# Patient Record
Sex: MALE | URBAN - METROPOLITAN AREA
[De-identification: names, ages, dates, MRNs, and addresses within clinical notes are randomized per-mention and may not be internally consistent; named-entity substitution may affect disease eponyms.]

---

## 2022-11-04 ENCOUNTER — OFFICE VISIT (OUTPATIENT)
Dept: URBAN - METROPOLITAN AREA CLINIC 71 | Facility: CLINIC | Age: 52
End: 2022-11-04
Payer: COMMERCIAL

## 2022-11-04 DIAGNOSIS — H52.13 MYOPIA, BILATERAL: ICD-10-CM

## 2022-11-04 DIAGNOSIS — H43.813 BILATERAL VITREOUS DETACHMENT OF EYES: Primary | ICD-10-CM

## 2022-11-04 DIAGNOSIS — H52.4 PRESBYOPIA: ICD-10-CM

## 2022-11-04 PROCEDURE — 92004 COMPRE OPH EXAM NEW PT 1/>: CPT | Performed by: PHYSICIAN ASSISTANT

## 2022-11-04 ASSESSMENT — INTRAOCULAR PRESSURE
OD: 13
OS: 13

## 2022-11-04 ASSESSMENT — VISUAL ACUITY
OD: 20/20
OS: 20/20

## 2022-11-04 ASSESSMENT — KERATOMETRY
OD: 43.13
OS: 42.75

## 2022-11-04 NOTE — IMPRESSION/PLAN
Impression: Bilateral vitreous detachment of eyes: H43.813. Plan: Posterior vitreous detachment present. There is no evidence of retinal pathology. All signs and risks of retinal detachment and tears were discussed in detail. Patient instructed to call the office immediately if any symptoms noted. Recommend the patient return to office for follow up. Answered all questions and addressed all concerns. Pt is  -- discussed use of art tears and washing out eyes for chemicals and foreign bodies. Pt voiced understanding. Answered all questions.

## 2022-11-18 ENCOUNTER — REFRACTIVE (OUTPATIENT)
Dept: URBAN - METROPOLITAN AREA CLINIC 64 | Facility: CLINIC | Age: 52
End: 2022-11-18

## 2022-11-18 DIAGNOSIS — H52.13 MYOPIA, BILATERAL: Primary | ICD-10-CM

## 2022-11-18 ASSESSMENT — KERATOMETRY
OS: 42.51
OD: 42.72

## 2025-02-24 ENCOUNTER — OFFICE VISIT (OUTPATIENT)
Dept: URBAN - METROPOLITAN AREA CLINIC 71 | Facility: CLINIC | Age: 55
End: 2025-02-24
Payer: COMMERCIAL

## 2025-02-24 DIAGNOSIS — H43.813 VITREOUS DEGENERATION, BILATERAL: ICD-10-CM

## 2025-02-24 DIAGNOSIS — H52.4 PRESBYOPIA: ICD-10-CM

## 2025-02-24 DIAGNOSIS — H04.123 DRY EYE SYNDROME OF BILATERAL LACRIMAL GLANDS: Primary | ICD-10-CM

## 2025-02-24 DIAGNOSIS — H17.89 OTHER CORNEAL SCAR: ICD-10-CM

## 2025-02-24 PROCEDURE — 99203 OFFICE O/P NEW LOW 30 MIN: CPT

## 2025-02-24 ASSESSMENT — VISUAL ACUITY
OD: 20/20
OS: 20/20

## 2025-02-24 ASSESSMENT — INTRAOCULAR PRESSURE
OD: 13
OS: 12